# Patient Record
Sex: FEMALE | Race: WHITE | NOT HISPANIC OR LATINO | Employment: FULL TIME | URBAN - METROPOLITAN AREA
[De-identification: names, ages, dates, MRNs, and addresses within clinical notes are randomized per-mention and may not be internally consistent; named-entity substitution may affect disease eponyms.]

---

## 2018-12-12 ENCOUNTER — TRANSCRIBE ORDERS (OUTPATIENT)
Dept: ADMINISTRATIVE | Facility: HOSPITAL | Age: 33
End: 2018-12-12

## 2018-12-12 DIAGNOSIS — R10.11 ABDOMINAL PAIN, RIGHT UPPER QUADRANT: Primary | ICD-10-CM

## 2018-12-17 ENCOUNTER — HOSPITAL ENCOUNTER (OUTPATIENT)
Dept: ULTRASOUND IMAGING | Facility: HOSPITAL | Age: 33
Discharge: HOME | End: 2018-12-17
Attending: INTERNAL MEDICINE | Admitting: INTERNAL MEDICINE

## 2018-12-17 DIAGNOSIS — R10.11 ABDOMINAL PAIN, RIGHT UPPER QUADRANT: ICD-10-CM

## 2018-12-17 PROCEDURE — 76705 ECHO EXAM OF ABDOMEN: CPT

## 2021-04-27 RX ORDER — ESCITALOPRAM OXALATE 5 MG/1
TABLET ORAL
Qty: 90 TABLET | Refills: 0 | Status: SHIPPED | OUTPATIENT
Start: 2021-04-27

## 2021-07-27 RX ORDER — ESCITALOPRAM OXALATE 5 MG/1
TABLET ORAL
Qty: 90 TABLET | Refills: 0 | OUTPATIENT
Start: 2021-07-27

## 2023-02-13 ENCOUNTER — TRANSCRIBE ORDERS (OUTPATIENT)
Dept: ULTRASOUND IMAGING | Facility: HOSPITAL | Age: 38
End: 2023-02-13
Payer: COMMERCIAL

## 2023-02-13 DIAGNOSIS — R11.0 NAUSEA: ICD-10-CM

## 2023-02-13 DIAGNOSIS — R10.9 ABDOMINAL PAIN, UNSPECIFIED ABDOMINAL LOCATION: Primary | ICD-10-CM

## 2025-03-02 ENCOUNTER — HOSPITAL ENCOUNTER (EMERGENCY)
Facility: HOSPITAL | Age: 40
Discharge: HOME | End: 2025-03-02
Attending: EMERGENCY MEDICINE | Admitting: EMERGENCY MEDICINE
Payer: COMMERCIAL

## 2025-03-02 ENCOUNTER — APPOINTMENT (OUTPATIENT)
Dept: GENERAL RADIOLOGY | Facility: HOSPITAL | Age: 40
End: 2025-03-02
Payer: COMMERCIAL

## 2025-03-02 VITALS
BODY MASS INDEX: 23.9 KG/M2 | TEMPERATURE: 97.7 F | SYSTOLIC BLOOD PRESSURE: 130 MMHG | OXYGEN SATURATION: 98 % | HEART RATE: 88 BPM | WEIGHT: 140 LBS | DIASTOLIC BLOOD PRESSURE: 78 MMHG | RESPIRATION RATE: 16 BRPM | HEIGHT: 64 IN

## 2025-03-02 DIAGNOSIS — S92.902A FOOT FRACTURE, LEFT, CLOSED, INITIAL ENCOUNTER: Primary | ICD-10-CM

## 2025-03-02 PROCEDURE — 73630 X-RAY EXAM OF FOOT: CPT

## 2025-03-02 PROCEDURE — 73590 X-RAY EXAM OF LOWER LEG: CPT

## 2025-03-02 PROCEDURE — 99283 EMERGENCY DEPT VISIT LOW MDM: CPT

## 2025-03-02 PROCEDURE — 99283 EMERGENCY DEPT VISIT LOW MDM: CPT | Performed by: EMERGENCY MEDICINE

## 2025-03-02 NOTE — DISCHARGE INSTRUCTIONS
Rest.  Wear the walking boot as directed.  Call Dr. Currie in the morning for follow-up appointment this week.  Take Motrin or Tylenol as needed as directed for pain.  Return to the emergency department there is increasing pain, numbness, tingling, weakness, change in color or temperature of the left lower extremity, worse in any way at all.

## 2025-03-02 NOTE — ED PROVIDER NOTES
Subjective   History of Present Illness    Chief complaint: Leg and foot injury    Location: Left    Quality/Severity: Burning    Timing/Onset/Duration: 2 hours ago    Modifying Factors: Hurts to bear    Associated Symptoms: No numbness, tingling, or weakness.  Patient has pain on ambulation    Narrative: This 40-year-old white female presents with left foot pain.  She twisted it playing basketball.    PCP:Thi Currie MD      Review of Systems    History reviewed. No pertinent past medical history.    Not on File    No past surgical history on file.    History reviewed. No pertinent family history.    Social History     Socioeconomic History    Marital status:            Objective   Physical Exam  Vitals (The temperature is 97.7 °F, pulse 88, respirations 16, /78, room air pulse ox 98%.) and nursing note reviewed.         Procedures           ED Course      18:47 EST, 03/02/25: The patient's diagnosis of left cuboid fracture and calcaneal fracture was discussed with her.  The patient does not wish a prescription for pain medication.  She will be fitted with a walking boot.  The patient should call Dr. Currie Monday morning for a follow-up appointment in 1 week.  Patient should rest.  She should take Motrin or Tylenol as needed as directed for pain.  The patient should return to the emergency department if there is increasing pain, numbness, tingling, weakness, change in color or temperature of the left lower extremity, worse in any way at all.  All the patient's questions were answered the patient will be discharged in good condition.                                                   Medical Decision Making      Final diagnoses:   Foot fracture, left, closed, initial encounter       ED Disposition  ED Disposition       None            No follow-up provider specified.       Medication List      No changes were made to your prescriptions during this visit.       No orders to display     Labs  Reviewed - No data to display  No results found.    Final diagnoses:   None         ED Medications:  Medications - No data to display    New Medications:     Medication List        ASK your doctor about these medications      escitalopram 5 MG tablet  Commonly known as: LEXAPRO  TAKE ONE TABLET BY MOUTH DAILY              Stopped Medications:     Medication List        ASK your doctor about these medications      escitalopram 5 MG tablet  Commonly known as: LEXAPRO  TAKE ONE TABLET BY MOUTH DAILY                   Mart Steward MD  03/02/25 1324